# Patient Record
(demographics unavailable — no encounter records)

---

## 2017-02-03 NOTE — CT
CT ABDOMEN AND PELVIS WITHOUT CONTRAST:

2/3/17

 

HISTORY: 

Nausea, vomiting, diarrhea. 

 

FINDINGS:  

The absence of oral and IV contrast reduces the sensitivity of the exam particularly for solid organ
s and bowel. 

 

The lung bases are clear. The patient is status post cholecystectomy. No free air or free fluid is s
een in the abdomen or pelvis. 

 

The liver demonstrates decreased echogenicity compared to the spleen consistent with fatty infiltrat
ion. There is atrophic change in the pancreas. No calculi is seen in the kidneys, ureters, or the ur
inary bladder. No hydroureteronephrosis is seen on either side. There is fluid in loops of small and
 large bowel and the rectum. A normal appearing appendix is seen. There is no evidence of aneurysmal
 dilatation of the abdominal aorta. There are degenerative changes in the spine. A small hiatal matheus
ia is seen. 

 

IMPRESSION:  

1.      Fatty liver. 

2.      Small hiatal hernia. 

3.      No CT evidence of urinary tract calculi/obstruction or appendicitis. 

4.      Probable enteritis/colitis.

 

POS: [facility]

## 2017-02-03 NOTE — RAD
PORTABLE CHEST 1 VIEW:

 

Date:  02/03/17

Time:  1705 hours

 

HISTORY:  

Nausea and vomiting. 

 

FINDINGS:

 

Comparison made with exam of 03/20/13. 

 

The heart size is normal. The lungs are well expanded without focal areas of consolidation, pneumoth
orax, or pleural effusions. 

 

IMPRESSION: 

No radiographic evidence of acute cardiopulmonary process. 

 

 

POS: [facility]

## 2017-02-03 NOTE — ERRECORD
[facility]

                                EMERGENCY RECORD



HPI NAUSEA/VOMITING/DIARRHEA (17:49 ABUS)

CHIEF COMPLAINT: Patient presents for evaluation of

      nausea, Patient presents for evaluation of vomiting,

      Patient presents for evaluation of diarrhea.

      HISTORIAN: History provided by patient, 63 yr old F

      here with acute onset of N/V/D that started about 2 hrs ago. She says

      she doesn't feel good and has had an ulcer before. This does not feel

      the same. Denies any melena. LOCATION FEMALE:

      Symptoms are localized, most severe in the

      epigastrium. QUALITY: Pain is dull in

      nature, described as aching. SEVERITY:

      Currently symptoms are severe, Current severity

      of pain rated as 10/10. TIME COURSE: Sudden onset

      of symptoms, just prior to arrival, There has

      been no change in the patient's symptoms over time.

      ASSOCIATED WITH FEMALE: Associated with diarrhea,

      Associated with nausea, Associated with vomiting.

      EXACERBATED BY: Patient's condition exacerbated by nothing.

      RELIEVED BY: Patient's condition relieved by

      nothing.



ROS (17:51 ABUS)

CONSTITUTIONAL: Negative constitutional review of systems,

      Historian denies chills, denies fever.

EYES: Negative eye review of systems, Historian denies eye pain,

      denies vision changes.

ENT: Negative ears, nose, throat review of systems, Historian

      denies rhinorrhea, denies sore throat, denies voice changes.

CARDIOVASCULAR: Negative cardiovascular review of systems,

      Historian denies chest pain, denies palpitations.

RESPIRATORY: Negative respiratory review of systems, Historian

      denies cough, denies shortness of breath.

GI: Historian reports abdominal pain, reports

      diarrhea, denies hematochezia, denies jaundice, denies melena,

      reports nausea, reports vomiting.

GENITOURINARY FEMALE: Negative genitourinary review of systems,

      Historian denies dysuria, denies frequency.

MUSCULOSKELETAL: Negative musculoskeletal review of systems,

      Historian denies back pain, denies fall, denies injury.

SKIN: Negative skin review of systems, Historian denies rash,

      denies skin changes.

NEUROLOGIC: Negative neurologic review of systems, Historian

      denies headache, denies mental status changes, denies paralysis,

      denies paresthesias, denies sensory changes.

HEMO/LYMPHATIC: Normal hematologic/lymphatic system review,

      Historian denies abnormal blood clotting.

ALLERGIC/IMMUNOLOGIC: Normal allergy/immunologic system review,

      Historian denies frequent infections.



PAST MEDICAL HISTORY



&a-1R&a+25V*p+0X*[id number]*c202B*c15G*c2P*p-0X&a-25V&a+1R         Name: Anne Barbosa  : 1953 F63 MedRec: [id number]

                             AcctNum: [id number]

  Prepared:  20:07 by Interface                 Page 1 of 4

                                      pMD

                        [facility]

                                EMERGENCY RECORD



MEDICAL HISTORY: Notes: multiple myleloma, stem cell

      transplant,, Past medical history includes history of

      diabetes, Type II, Past medical history includes

      endocrine disease, hyperthyroidism, Past medical

      history includes gastrointestinal disease, gastroesophageal

      reflux disease, peptic ulcer disease, Past

      medical history includes history of hyperlipidemia, high

      cholesterol, Past medical history includes history of

      hypertension, which has been treated, Past medical history

      includes history of obesity, pulmonary disease.

      asthma. (18:25 SFRE)

FEMALE SURGICAL HISTORY: Surgical history of

      hysterectomy, Surgical history of orthopedic surgery,

      back, Surgical history of tonsillectomy. (18:29

      SFRE)

PSYCHIATRIC HISTORY: Psychiatric history includes,

      anxiety, depression. (18:30 SFRE)



KNOWN ALLERGIES

tetracycline



CURRENT MEDICATIONS (18:36 SFRE)

metFORMIN: 

      TABLET : Strength - 1,000 mg : ORAL

      Patient Dose: 500 mg Oral 2 times a day.

Synthroid: 

      TABLET : Strength - 50 mcg : ORAL

      Patient Dose: 5 mg Oral once a day.



VITAL SIGNS (16:30 SFRE)

VITAL SIGNS: BP: 155/83, Pulse: 80, Resp: 18, Temp: 98.4

      (Tympanic), Pain: 10 (Stabbing), O2 sat: 100 on 2L Oxygen, Time:

      2/3/2017 16:30.



PHYSICAL EXAM (17:51 ABUS)

CONSTITUTIONAL: Vital signs reviewed, Patient afebrile, Pulse

      normal, Blood pressure normal, Respiratory rate normal, Patient

      appears non toxic, Patient appears pain free, Patient alert and

      oriented to person, place and time.

HEAD: Head exam normal, Head exam included findings of head

      atraumatic, normocephalic.

EYES: Eye exam normal, Eye exam included findings of eyelids

      normal to inspection, Pupils equally round and reactive to light,

      Extraocular muscles intact, no nystagmus.

ENT: ENT exam normal, Ear exam normal, external ear normal,

      tympanic membranes normal, no bleeding, Pharynx exam normal, Uvula

      exam normal, Tonsil exam normal, Mouth exam normal, mucous membranes

      moist, teeth normal.

NECK: Neck exam normal, Neck exam included findings of normal

      range of motion, Trachea midline, no meningeal signs, no cervical



&a-1R&a+25V*p+0X*[id number]*c202B*c15G*c2P*p-0X&a-25V&a+1R         Name: Anne Barbosa  : 1953 F63 MedRec: [id number]

                             AcctNum: [id number]

  Prepared:  20:07 by Interface                 Page 2 of 4

                                      pMD

                        [facility]

                                EMERGENCY RECORD



      adenopathy, no tenderness.

RESPIRATORY CHEST: Respiratory and chest exam normal, Respiratory

      exam included findings of no respiratory distress, Breath sounds

      clear.

CARDIOVASCULAR: Cardiovascular assessment normal, Cardiovascular

      exam included findings of heart rate regular rate and rhythm, Heart

      sounds normal.

ABDOMEN FEMALE: Abdominal exam included findings of abdomen

      nontender, Bowel sounds, hypoactive, no

      distension, no mass, no peritoneal signs, no rigidity, no guarding,

      no rebound.

BACK: Back exam normal, Back exam included findings of normal

      inspection, range of motion normal, no tenderness.

UPPER EXTREMITY: Upper extremity exam normal, Upper extremity

      exam included findings of inspection normal, Range of motion normal,

      Motor strength normal, Sensation intact, Radial pulse normal.

LOWER EXTREMITY: Lower extremity exam normal, Lower extremity

      exam included findings of inspection normal, Range of motion normal,

      Motor strength normal, Sensation intact, Posterior tibial pulse

      normal, Pedal pulse normal.

NEURO: Neuro exam normal, Neuro exam findings include patient

      oriented to person, place and time, Speech normal, Gait normal,

      Cranial nerves intact, no focal motor deficits, no focal sensory

      deficits.

SKIN: Skin exam normal, Skin exam included findings of skin warm,

      dry, and normal in color, no rash.

PSYCHIATRIC: Psychiatric exam normal, Normal affect.



RADIOLOGYINTERPRETATION (19:32 ABUS)

ABDOMEN: Abdomen/pelvis CT scan, without contrast negative, no

      abdominal aortic aneurysm, no appendicitis, no diverticulitis, no

      kidney stones, no injuries, no mass, no obstruction, no free air, no

      hydronephrosis.

: Preliminary review of CT scans by, ED Physician,

      Radiologist.



MEDICATION ADMINISTRATION SUMMARY



      Drug Name: promethazine injection, Dose Ordered: 25 mg, Route: IV

      Piggy Back, Status: Given, Time: 20:00 2/3/2017,

      Drug Name: *sodium chloride 0.9 % intravenous, Dose Ordered: 2 L,

      Route: IV Fluid Infusion, Status: Given, Time: 19:59 2/3/2017,

      Drug Name: morphine (PF) injection, Dose Ordered: 4 mg, Route: IV

      Push, Status: Given, Time: 18:21 2/3/2017,

      Drug Name: pantoprazole intravenous, Dose Ordered: 40 mg, Route: IV

      Push, Status: Given, Time: 18:09 2/3/2017,

      Drug Name: Zofran intravenous, Dose Ordered: 8 mg, Route: IV Push,

      Status: Given, Time: 18:08 2/3/2017,

      Drug Name: *sodium chloride 0.9 % intravenous, Dose Ordered: 1 L,

      Route: IV Fluid Infusion, Status: Given, Time: 18:05 2/3/2017,



&a-1R&a+25V*p+0X*v4921P*c202B*c15G*c2P*p-0X&a-25V&a+1R         Name: Anne Barbosa  : 1953 F63 MedRec: [id number]

                             AcctNum: [id number]

  Prepared:  20:07 by Interface                 Page 3 of 4

                                      pMD

                        [facility]

                                EMERGENCY RECORD



      *Additional information available in notes, Detailed record available

      in Medication Service section.



DOCTOR NOTES (17:52 ABUS)

TEXT:   63 yr old F here with acute onset of N/V/D that

      started about 2 hrs ago. She says she doesn't feel good and has had

      an ulcer before

      Exam: LISBETH pain to palpation

      DDX: viral gastroenteritis, gastritis, dehydration, electrolyte

      disorder, pancreatitis, cholecystitis

      Plan: IV, IVF, labs, UA, antiemetics, CXR.



PROBLEM LIST

  No recorded problems



DIAGNOSIS (19:45 ABUS)

FINAL: PRIMARY: Diarrhea, ADDITIONAL: Nausea with

      vomiting.



PRESCRIPTION

  No recorded prescriptions



DISPOSITION

PATIENT:  Disposition Type: Transfer, Disposition: Transfer to

      [facility], Disposition Transport: Ambulance, Condition: Fair. (19:45

      ABUS)

   Patient left the department. (20:03 JDEA)

Bernal:

  GENIE=MD Christie, Bart NEAL=Billy, MAX, Julisa SIMMONS=MAX Angel, Iraida













































&a-1R&a+25V*p+0X*w7406G*c202B*c15G*c2P*p-0X&a-25V&a+1R         Name: Anne Barbosa  : 1953 F63 MedRec: [id number]

                             AcctNum: [id number]

  Prepared:  20:07 by Interface                 Page 4 of 4

                                      pMD

MTDD

## 2017-02-03 NOTE — PICIS
[facility]

                                EMERGENCY RECORD



TRIAGE ( 16:31 SFRE)

TRIAGE NOTES:  N/V/D THAT STARTED 2 HOURS AGO. ( 16:31 SFRE)

PATIENT: AGE: 63, GENDER: female, : Wed Dec 30, 1953, TIME OF

      GREET:  16:28, PREFERRED LANGUAGE: English, ECODE

      BILLING MAP: [facility], Zip Code: [facility], [place] WEIGHT:

      104.33, PHONE: [phone], MEDICAL RECORD NUMBER: [id number],

      ACCOUNT NUMBER: [id number], PERSON ID: [id number], PCP: OOT. ( 16:31 SFRE)

  NAME: Anne Barbosa, ETHNICITY: Not  or . (16:44)

COMPLAINT:  DIARRHEA, VOMITING. ( 16:31 SFRE)

ADMISSION: URGENCY: 3 Urgent, ADMISSION SOURCE: Home, TRANSPORT:

      Walk-in, BED: ED -03. ( 16:31 SFRE)

SIRS SCORING: Heart Rate  (0), Temp range 96.8-101.1 (0),

      respiratory rate 12-24 (0), Mental Status altered: no (0). (18:25

      SFRE)

TRIAGE SCREENING: Patient denies suicidal ideation, Patient

      denies presence of domestic violence. (18:25 SFRE)

PROVIDERS: TRIAGE NURSE: Iraida Angel RN. (

      16:31 SFRE)

VITAL SIGNS: /83, Pulse 80, Resp 18, Temp 98.4, (Tympanic),

      Pain 10, (Stabbing), O2 Sat 100, on 2L Oxygen, Time 2/3/2017 16:30.

      (16:30 SFRE)



KNOWN ALLERGIES

tetracycline



CURRENT MEDICATIONS (18:36 SFRE)

metFORMIN: 

      TABLET : Strength - 1,000 mg : ORAL

      Patient Dose: 500 mg Oral 2 times a day.

Synthroid: 

      TABLET : Strength - 50 mcg : ORAL

      Patient Dose: 5 mg Oral once a day.



VITAL SIGNS (16:30 SFRE)

VITAL SIGNS: BP: 155/83, Pulse: 80, Resp: 18, Temp: 98.4

      (Tympanic), Pain: 10 (Stabbing), O2 sat: 100 on 2L Oxygen, Time:

      2/3/2017 16:30.



NURSING ASSESSMENT: ABDOMEN (18:30 SFRE)

CONSTITUTIONAL: Patient arrives ambulatory, Gait steady, History

      obtained from patient, Patient appears, generally ill,

      Patient cooperative, Patient alert, Oriented to person, place and

      time, Skin abnormal, Skin temperature is cold, Skin dry,

      Skin, pale in color, Mucous membranes pink,

      Mucous membranes, tacky, Patient is well-groomed,

      Patient complains of N/V/D.

PAIN: stabbing pain, diffusely, Onset of pain

      2 HRS PTA, constant, on a scale 0-10 patient



&a-1R&a+25V*p+0X*c6655I*c202B*c15G*c2P*p-0X&a-25V&a+1R         Name: Anne Barbosa  : 1953 F63 MedRec: [id number]

                             AcctNum: [id number]

  Prepared:  20:07 by Interface                 Page 1 of 13

                                      pMD

                        [facility]

                                EMERGENCY RECORD



      rates pain as 10, Pain exacerbated by nothing, Nothing has

      been tried to alleviate the pain.

ABDOMEN: Abdomen assessment findings include abdomen symmetrical,

      Abdomen soft, tender, diffusely, Associated

      with nausea, Associated with vomiting, Associated

      with diarrhea, Number of episodes: MULTIPLE,

      CLEAR FLUID.

GENITOURINARY FEMALE: no associated urinary complaints.

SAFETY: Side rails up, Cart/Stretcher in lowest position, Family

      at bedside, Call light within reach, Hospital ID band on.



NURSING ASSESSMENT: FALL RISK (19:42 JDEA)

FALL RISK: Fall risk assessment findings include: no history of

      falls (0), No bed rest greater than 2 days (0), No use of level of

      consciousness altering agents with mentation or cognitive changes

      (0), No change in blood pressure (0), No sensory deficits (0), No

      impaired mobility (0), No neurologic diagnosis (0), Elimination

      problems (3), No confusion (0), Total score 3, No risk

      for fall, Notes: utilizes standby assist.



NURSING ASSESSMENT: SKIN (19:42 JDEA)

SKIN: Skin assessment findings include skin warm, Skin dry, Skin

      normal in color.

MARY ANN SCALE: (4) Sensory perception has no impairment, (4) Skin

      is rarely moist, (3) Patient walks occasionally, (3)

      Slightly limited mobility, (3) Adequate nutrition,

      (2) Patient has potential problem moving, Mary Ann Risk

      Total: 19.

NOTES: Patient tolerated procedure well.



NURSING PROCEDURE: ELIMINATION (19:20 JDEA)

PATIENT IDENTIFIER: Patient actively involved in identification

      process.

ELIMINATION: Patient assisted to bedside commode, Patient had a

      large amount of stool per bedpan, liquid in form, Stool

      specimen collected, labeled in the presence of the patient and sent

      to lab, Patient incontinent of stool, Incontinence

      care given, Stool specimen collected, and sent to lab, Stool

      specimen for culture collected, and sent to lab, and labeled in the

      presence of the patient.

NOTES: Patient tolerated procedure well.



NURSING PROCEDURE: IV

IV SITE 1: IV therapy indicated for hydration, IV therapy

      indicated for medication administration, IV established, to the right

      antecubital, using a 20 gauge catheter, in one attempt, Saline lock

      established, Flushed with normal saline (mls): 5CC. (18:33 SFRE)

FOLLOW-UP SITE 1: After procedure, no drainage at IV site, After

      procedure, no swelling at IV site, After procedure, no redness at IV

      site. (18:34 SFRE)



&a-1R&a+25V*p+0X*j3654W*c202B*c15G*c2P*p-0X&a-25V&a+1R         Name: Anne Barbosa  : 1953 F63 MedRec: [id number]

                             AcctNum: [id number]

  Prepared:  20:07 by Interface                 Page 2 of 13

                                      pMD

                        [facility]

                                EMERGENCY RECORD





NURSING PROCEDURE: NURSE NOTES (18:37 SFRE)

NURSES NOTES: Notes: ON BSC WITH CONTINUOUS DIARRHEA.



NURSING PROCEDURE: TRANSFER (20:01 JDEA)

TRANSFER: Reason for transfer need for specialized care,

      Diagnosis: colitis, Accepting institution: [facility],

      Accepting physician: luci, Referring physician: christie, Transported

      by non-urgent ambulance, accompanied by emergency medical services

      personnel, Report called to receiving facility, Summary of Care

      printed.

BELONGINGS: Belongings and valuables with patient at time of

      discharge include:, Belongings remain with patient.

NOTES: Patient tolerated procedure well.



NURSING PROCEDURE: TRANSPORT TO TESTS

PATIENT IDENTIFIER: Patient actively involved in identification

      process, Patient's identity verified by patient stating name,

      Patient's identity verified by patient stating birth date, Patient's

      identity verified by hospital ID bracelet. (18:59 JDEA)

TRANSPORT TO TESTS: Transport indicated to facilitate diagnosis,

      Patient transported to CT scan, via wheelchair, Accompanied by x-ray

      technician. (18:59 JDEA)

FOLLOW-UP: After procedure, patient returned to emergency

      department. (19:15 JDEA)

NOTES: Patient tolerated procedure well. (18:59 JDEA)

SAFETY: Side rails up, Cart/Stretcher in lowest position, Family

      at bedside, Call light within reach, Hospital ID band on. (18:59

      JDEA)



ORDER DETAILS



      Order Name: Cardiac Profile w/CKMB & Troponin - I, Status: Active,

      Time: 16:43 2/3/2017, User: ABUS,

       - Ordered for: MD Soriano Anthony,

       - Entered by: MD Soriano Anthony -  16:43,

       - Quantity: 1,

      Order Name: CBC with Differential, Status: Active, Time: 16:43

      2/3/2017, User: ABUS,

       - Ordered for: MD Soriano Anthony,

       - Entered by: MD Soriano Anthony -  16:43,

       - Quantity: 1,

      Order Name: Clostridium difficile Screen, Status: Active, Time: 18:58

      2/3/2017, User: JDEA,

       - Ordered for: MD Soriano Anthony,

       - Entered by: MAX Cervantes Justine -  18:58,

       - Quantity: 1,

      Order Name: Comprehensive Metabolic Panel, Status: Active, Time:

      16:43 2/3/2017, User: ABUS,

       - Ordered for: MD Soriano Anthony,



&a-1R&a+25V*p+0X*b3666F*c202B*c15G*c2P*p-0X&a-25V&a+1R         Name: Anne Barbosa  : 1953 F63 MedRec: [id number]

                             AcctNum: [id number]

  Prepared:  20:07 by Interface                 Page 3 of 13

                                      pMD

                        [facility]

                                EMERGENCY RECORD



       - Entered by: MD Soriano Anthony -  16:43,

       - Quantity: 1,

      Order Name: CT Abdomen Pelvis W Con, Status: Canceled, Time: 18:41

      2/3/2017, User: System,

       - Ordered for: MD Soriano Anthony,

       - Entered by: MD Soriano Anthony -  18:09,

       - Quantity: 1,

      Order Name: Culture, Blood, Status: Active, Time: 18:08 2/3/2017,

      User: ABUS,

       - Ordered for: MD Soriano Anthony,

       - Entered by: MD Soriano Anthony -  18:08,

       - Quantity: 1,

      Order Name: Culture, Stool & Campy Screen, Status: Active, Time:

      18:58 2/3/2017, User: VAL,

       - Ordered for: MD Soriano Anthony,

       - Entered by: MAX Cervantes, Julisa -  18:58,

       - Quantity: 1,

      Order Name: Culture, Stool for E.coli O157, Status: Active, Time:

      18:58 2/3/2017, User: VAL,

       - Ordered for: MD Soriano Anthony,

       - Entered by: MAX Cervantes, Julisa -  18:58,

       - Quantity: 1,

      Order Name: Culture, Urine, Status: Active, Time: 18:08 2/3/2017,

      User: AB,

       - Ordered for: MD Soriano Anthony,

       - Entered by: MD Soriano Anthony -  18:08,

       - Quantity: 1,

      Order Name: Fecal Lactoferrin, Status: Active, Time: 18:58 2/3/2017,

      User: VAL,

       - Ordered for: MD Soriano Anthony,

       - Entered by: MAX Cervantes, Julisa -  18:58,

       - Quantity: 1,

      Order Name: Lactic Acid, Status: Active, Time: 16:44 2/3/2017, User:

      GENIE,

       - Ordered for: MD Soriano Anthony,

       - Entered by: MD Soriano Anthony -  16:44,

       - Quantity: 1,

      Order Name: Lipase, Status: Active, Time: 16:43 2/3/2017, User: ABUS,

       - Ordered for: MD Soriano Anthony,

       - Entered by: MD Soriano Anthony -  16:43,

       - Quantity: 1,

      Order Name: Magnesium, Status: Active, Time: 16:44 2/3/2017, User:

      ABUS,

       - Ordered for: MD Soriano Anthony,

       - Entered by: MD Soriano Anthony -  16:44,

       - Quantity: 1,

      Order Name: SALINE LOCK, Status: Done, Time: 18:26 2/3/2017, User:

      SFRE,

       - Ordered for: MD Soriano Anthony,

       - Entered by: MD Soriano Anthony -  16:44,



&a-1R&a+25V*p+0X*h8546L*c202B*c15G*c2P*p-0X&a-25V&a+1R         Name: Anne Barbosa  : 1953 F63 MedRec: [id number]

                             AcctNum: [id number]

  Prepared:  20:07 by Interface                 Page 4 of 13

                                      pMD

                        [facility]

                                EMERGENCY RECORD



       - Quantity: 1,

      Order Name: Type & Screen, Status: Active, Time: 16:43 2/3/2017,

      User: ABUS,

       - Ordered for: MD Soriano Anthony,

       - Entered by: MD Soriano Anthony -  16:43,

       - Quantity: 1,

      Order Name: Urinalysis w/ Rflx Microscopic, Status: Active, Time:

      16:43 2/3/2017, User: ABUS,

       - Ordered for: MD Soriano Anthony,

       - Entered by: MD Soriano Anthony -  16:43,

       - Quantity: 1,

      Order Name: XR Chest 1 View Portable, Status: Active, Time: 16:43

      2/3/2017, User: ABUS,

       - Ordered for: MD Soriano Anthony,

       - Entered by: MD Soriano Anthony -  16:43,

       - Quantity: 1.



MEDICATION ADMINISTRATION SUMMARY



      Drug Name: promethazine injection, Dose Ordered: 25 mg, Route: IV

      Piggy Back, Status: Given, Time: 20:00 2/3/2017,

      Drug Name: *sodium chloride 0.9 % intravenous, Dose Ordered: 2 L,

      Route: IV Fluid Infusion, Status: Given, Time: 19:59 2/3/2017,

      Drug Name: morphine (PF) injection, Dose Ordered: 4 mg, Route: IV

      Push, Status: Given, Time: 18:21 2/3/2017,

      Drug Name: pantoprazole intravenous, Dose Ordered: 40 mg, Route: IV

      Push, Status: Given, Time: 18:09 2/3/2017,

      Drug Name: Zofran intravenous, Dose Ordered: 8 mg, Route: IV Push,

      Status: Given, Time: 18:08 2/3/2017,

      Drug Name: *sodium chloride 0.9 % intravenous, Dose Ordered: 1 L,

      Route: IV Fluid Infusion, Status: Given, Time: 18:05 2/3/2017,

      *Additional information available in notes, Detailed record available

      in Medication Service section.



MEDICATION SERVICE

morphine (PF) injection:  Order: morphine (PF) injection

      (morphine sulfate/preservative free) - Dose: 4 mg : IV Push

      Schedule: Now

      Ordered by: Bart Soriano MD

      Entered by: Bart Soriano MD  17:57 ,

      Acknowledged by: Iraida Angel RN  18:09

      Documented as given by: Iraida Angel RN  18:21

      Patient, Medication, Dose, Route and Time verified prior to

      administration.

       Amount given: 4mg, IV SITE #1 IVP, subsequent different medication,

      Slowly, Awake and alert- acceptable, Catheter placement confirmed via

      flush prior to administration, IV site without signs or symptoms of

      infiltration during medication administration, No swelling during

      administration, No drainage during administration, IV flushed after

      administration, Correct patient, time, route, dose and medication



&a-1R&a+25V*p+0X*y4475Y*c202B*c15G*c2P*p-0X&a-25V&a+1R         Name: Anne Barbosa  : 1953 F63 MedRec: [id number]

                             AcctNum: [id number]

  Prepared:  20:07 by Interface                 Page 5 of 13

                                      pMD

                        [facility]

                                EMERGENCY RECORD



      confirmed prior to administration, Patient advised of actions and

      side-effects prior to administration, Allergies confirmed and

      medications reviewed prior to administration, Patient in position of

      comfort, Side rails up, Cart in lowest position, Family at bedside.

pantoprazole intravenous:  Order: pantoprazole intravenous

      (pantoprazole sodium) - Dose: 40 mg : IV Push

      Schedule: Now

      Ordered by: Bart Soriano MD

      Entered by: Bart Soriano MD  16:45 ,

      Acknowledged by: Iraida Angel RN  17:56

      Documented as given by: Iraida Angel RN  18:09

      Patient, Medication, Dose, Route and Time verified prior to

      administration.

       Amount given: 40mg, IV SITE #1 IVP, subsequent different medication,

      Slowly, Awake and alert- acceptable, Catheter placement confirmed via

      flush prior to administration, IV site without signs or symptoms of

      infiltration during medication administration, No swelling during

      administration, No drainage during administration, IV flushed after

      administration, Correct patient, time, route, dose and medication

      confirmed prior to administration, Patient advised of actions and

      side-effects prior to administration, Allergies confirmed and

      medications reviewed prior to administration, Patient in position of

      comfort, Side rails up, Cart in lowest position, Family at bedside.

promethazine injection:  Order: promethazine injection

      (promethazine HCl) - Dose: 25 mg : IV Piggy Back

      Schedule: Now

      Ordered by: Bart Soriano MD

      Entered by: Bart Soriano MD  19:13 ,

      Acknowledged by: Julisa Cervantes RN  19:24

      Documented as given by: Julisa Cervantes RN  20:00

      Patient, Medication, Dose, Route and Time verified prior to

      administration.

       Amount given: 25mg, IV SITE #1 IVPB or drip, initial infusion, Awake

      and alert- acceptable, Ordering Physician approved to Give,

      Connections checked prior to administration, Line traced prior to

      administration, Catheter placement confirmed via flush prior to

      administration, IV site without signs or symptoms of infiltration

      during medication administration, No swelling during administration,

      No drainage during administration, IV flushed after administration,

      Correct patient, time, route, dose and medication confirmed prior to

      administration, Patient advised of actions and side-effects prior to

      administration, Allergies confirmed and medications reviewed prior to

      administration, Patient in position of comfort, Side rails up, Cart

      in lowest position, Family at bedside, Call light in reach.

sodium chloride 0.9 % intravenous:  Order: sodium chloride 0.9 %

      intravenous (0.9 % sodium chloride) - Dose: 1 L : IV Fluid

      Infusion

      Schedule: Now

      Notes: (Bolus)

      Ordered by: Bart Soriano MD



&a-1R&a+25V*p+0X*y2245B*c202B*c15G*c2P*p-0X&a-25V&a+1R         Name: Anne Barbosa  : 1953 F63 MedRec: [id number]

                             AcctNum: [id number]

  Prepared:  20:07 by Interface                 Page 6 of 13

                                      pMD

                        [facility]

                                EMERGENCY RECORD



      Entered by: Bart Soriano MD  16:44 ,

      Acknowledged by: Iraida Angel RN  17:56

      Documented as given by: Iraida Angel RN  18:05

      Patient, Medication, Dose, Route and Time verified prior to

      administration.

       Amount given: 1l, IV SITE #1 IV fluids established for hydration, IV

      SITE #1 into left antecubital, IV SITE #1 Rate of bolus, wide open,

      via primary tubing, IV SITE #1 Tubing changed to pump tubing, Awake

      and alert- acceptable, Catheter placement confirmed via flush prior

      to administration, IV site without signs or symptoms of infiltration

      during medication administration, No swelling during administration,

      No drainage during administration, IV flushed after administration,

      Correct patient, time, route, dose and medication confirmed prior to

      administration, Patient advised of actions and side-effects prior to

      administration, Allergies confirmed and medications reviewed prior to

      administration, Patient in position of comfort, Side rails up, Cart

      in lowest position, Family at bedside.

: Follow Up : Response assessment performed, No signs or

      symptoms of allergic reaction noted, _IV SITE #1:_, IV fluid infusion

      discontinued, on  20:00, Total fluid hydration time

      IV site 1 1 hour, 55 minutes, ., Total amount infused: 1L.

      (20:00 JDEA)

sodium chloride 0.9 % intravenous:  Order: sodium chloride 0.9 %

      intravenous (0.9 % sodium chloride) - Dose: 2 L : IV Fluid

      Infusion

      Schedule: Now

      Notes: (Bolus)

      Ordered by: Bart Soriano MD

      Entered by: Bart Soriano MD  18:01 ,

      Acknowledged by: Iraida Angel RN  18:39

      Documented as given by: Julisa Cervantes RN  19:59

      Patient, Medication, Dose, Route and Time verified prior to

      administration.

       Amount given: 2L, IV SITE #1 IV fluids established for hydration,

      Catheter placement confirmed via flush prior to administration, IV

      site without signs or symptoms of infiltration during medication

      administration, No swelling during administration, No drainage during

      administration, IV flushed after administration, Correct patient,

      time, route, dose and medication confirmed prior to administration,

      Patient advised of actions and side-effects prior to administration,

      Allergies confirmed and medications reviewed prior to administration,

      Patient in position of comfort, Side rails up, Cart in lowest

      position, Family at bedside, Call light in reach.

: Follow Up : Response assessment performed, No signs or

      symptoms of allergic reaction noted, _IV SITE #1:_, IV fluid infusion

      discontinued, on  20:01, 5 minutes, ., Total amount

      infused: 2L. (20:01 JDEA)

Zofran intravenous:  Order: Zofran intravenous (ondansetron HCl)

      - Dose: 8 mg : IV Push

      Schedule: Now



&a-1R&a+25V*p+0X*c8251D*c202B*c15G*c2P*p-0X&a-25V&a+1R         Name: Anne Barbosa  : 1953 F63 MedRec: [id number]

                             AcctNum: [id number]

  Prepared:  20:07 by Interface                 Page 7 of 13

                                      pMD

                        [facility]

                                EMERGENCY RECORD



      Ordered by: Bart Soriano MD

      Entered by: Bart Soriano MD Fri 2017 16:46 ,

      Acknowledged by: Iraida Angel RN Fri 2017 17:56

      Documented as given by: Iraida Angel RN Fri 2017 18:08

      Patient, Medication, Dose, Route and Time verified prior to

      administration.

       Amount given: 8mg, IV SITE #1 IVP, initial medication, Slowly, Awake

      and alert- acceptable, Catheter placement confirmed via flush prior

      to administration, IV site without signs or symptoms of infiltration

      during medication administration, No swelling during administration,

      No drainage during administration, IV flushed after administration,

      Correct patient, time, route, dose and medication confirmed prior to

      administration, Patient advised of actions and side-effects prior to

      administration, Allergies confirmed and medications reviewed prior to

      administration, Patient in position of comfort, Side rails up, Cart

      in lowest position, Family at bedside.



HPI NAUSEA/VOMITING/DIARRHEA (17:49 ABUS)

CHIEF COMPLAINT: Patient presents for evaluation of

      nausea, Patient presents for evaluation of vomiting,

      Patient presents for evaluation of diarrhea.

      HISTORIAN: History provided by patient, 63 yr old F

      here with acute onset of N/V/D that started about 2 hrs ago. She says

      she doesn't feel good and has had an ulcer before. This does not feel

      the same. Denies any melena. LOCATION FEMALE:

      Symptoms are localized, most severe in the

      epigastrium. QUALITY: Pain is dull in

      nature, described as aching. SEVERITY:

      Currently symptoms are severe, Current severity

      of pain rated as 10/10. TIME COURSE: Sudden onset

      of symptoms, just prior to arrival, There has

      been no change in the patient's symptoms over time.

      ASSOCIATED WITH FEMALE: Associated with diarrhea,

      Associated with nausea, Associated with vomiting.

      EXACERBATED BY: Patient's condition exacerbated by nothing.

      RELIEVED BY: Patient's condition relieved by

      nothing.



ROS (17:51 ABUS)

CONSTITUTIONAL: Negative constitutional review of systems,

      Historian denies chills, denies fever.

EYES: Negative eye review of systems, Historian denies eye pain,

      denies vision changes.

ENT: Negative ears, nose, throat review of systems, Historian

      denies rhinorrhea, denies sore throat, denies voice changes.

CARDIOVASCULAR: Negative cardiovascular review of systems,

      Historian denies chest pain, denies palpitations.

RESPIRATORY: Negative respiratory review of systems, Historian

      denies cough, denies shortness of breath.

GI: Historian reports abdominal pain, reports



&a-1R&a+25V*p+0X*v4616D*c202B*c15G*c2P*p-0X&a-25V&a+1R         Name: Anne Barbosa  : 1953 F63 MedRec: [id number]

                             AcctNum: [id number]

  Prepared:  20:07 by Interface                 Page 8 of 13

                                      pMD

                        [facility]

                                EMERGENCY RECORD



      diarrhea, denies hematochezia, denies jaundice, denies melena,

      reports nausea, reports vomiting.

GENITOURINARY FEMALE: Negative genitourinary review of systems,

      Historian denies dysuria, denies frequency.

MUSCULOSKELETAL: Negative musculoskeletal review of systems,

      Historian denies back pain, denies fall, denies injury.

SKIN: Negative skin review of systems, Historian denies rash,

      denies skin changes.

NEUROLOGIC: Negative neurologic review of systems, Historian

      denies headache, denies mental status changes, denies paralysis,

      denies paresthesias, denies sensory changes.

HEMO/LYMPHATIC: Normal hematologic/lymphatic system review,

      Historian denies abnormal blood clotting.

ALLERGIC/IMMUNOLOGIC: Normal allergy/immunologic system review,

      Historian denies frequent infections.



PAST MEDICAL HISTORY

MEDICAL HISTORY: Notes: multiple myleloma, stem cell

      transplant,, Past medical history includes history of

      diabetes, Type II, Past medical history includes

      endocrine disease, hyperthyroidism, Past medical

      history includes gastrointestinal disease, gastroesophageal

      reflux disease, peptic ulcer disease, Past

      medical history includes history of hyperlipidemia, high

      cholesterol, Past medical history includes history of

      hypertension, which has been treated, Past medical history

      includes history of obesity, pulmonary disease.

      asthma. (18:25 SFRE)

FEMALE SURGICAL HISTORY: Surgical history of

      hysterectomy, Surgical history of orthopedic surgery,

      back, Surgical history of tonsillectomy. (18:29

      SFRE)

PSYCHIATRIC HISTORY: Psychiatric history includes,

      anxiety, depression. (18:30 SFRE)



PHYSICAL EXAM (17:51 ABUS)

CONSTITUTIONAL: Vital signs reviewed, Patient afebrile, Pulse

      normal, Blood pressure normal, Respiratory rate normal, Patient

      appears non toxic, Patient appears pain free, Patient alert and

      oriented to person, place and time.

HEAD: Head exam normal, Head exam included findings of head

      atraumatic, normocephalic.

EYES: Eye exam normal, Eye exam included findings of eyelids

      normal to inspection, Pupils equally round and reactive to light,

      Extraocular muscles intact, no nystagmus.

ENT: ENT exam normal, Ear exam normal, external ear normal,

      tympanic membranes normal, no bleeding, Pharynx exam normal, Uvula

      exam normal, Tonsil exam normal, Mouth exam normal, mucous membranes

      moist, teeth normal.

NECK: Neck exam normal, Neck exam included findings of normal



&a-1R&a+25V*p+0X*t6285N*c202B*c15G*c2P*p-0X&a-25V&a+1R         Name: Anne Barbosa  : 1953 F63 MedRec: [id number]

                             AcctNum: [id number]

  Prepared:  20:07 by Interface                 Page 9 of 13

                                      pMD

                        [facility]

                                EMERGENCY RECORD



      range of motion, Trachea midline, no meningeal signs, no cervical

      adenopathy, no tenderness.

RESPIRATORY CHEST: Respiratory and chest exam normal, Respiratory

      exam included findings of no respiratory distress, Breath sounds

      clear.

CARDIOVASCULAR: Cardiovascular assessment normal, Cardiovascular

      exam included findings of heart rate regular rate and rhythm, Heart

      sounds normal.

ABDOMEN FEMALE: Abdominal exam included findings of abdomen

      nontender, Bowel sounds, hypoactive, no

      distension, no mass, no peritoneal signs, no rigidity, no guarding,

      no rebound.

BACK: Back exam normal, Back exam included findings of normal

      inspection, range of motion normal, no tenderness.

UPPER EXTREMITY: Upper extremity exam normal, Upper extremity

      exam included findings of inspection normal, Range of motion normal,

      Motor strength normal, Sensation intact, Radial pulse normal.

LOWER EXTREMITY: Lower extremity exam normal, Lower extremity

      exam included findings of inspection normal, Range of motion normal,

      Motor strength normal, Sensation intact, Posterior tibial pulse

      normal, Pedal pulse normal.

NEURO: Neuro exam normal, Neuro exam findings include patient

      oriented to person, place and time, Speech normal, Gait normal,

      Cranial nerves intact, no focal motor deficits, no focal sensory

      deficits.

SKIN: Skin exam normal, Skin exam included findings of skin warm,

      dry, and normal in color, no rash.

PSYCHIATRIC: Psychiatric exam normal, Normal affect.



EVENTS

TRANSFER:  Triage to Emergency Main ED -03. (

      16:31 SFRE)

   Removed from Emergency Main ED -03. (20:03 JDEA)



RADIOLOGYINTERPRETATION (19:32 ABUS)

ABDOMEN: Abdomen/pelvis CT scan, without contrast negative, no

      abdominal aortic aneurysm, no appendicitis, no diverticulitis, no

      kidney stones, no injuries, no mass, no obstruction, no free air, no

      hydronephrosis.

: Preliminary review of CT scans by, ED Physician,

      Radiologist.



DOCTOR NOTES (17:52 ABUS)

TEXT:   63 yr old F here with acute onset of N/V/D that

      started about 2 hrs ago. She says she doesn't feel good and has had

      an ulcer before

      Exam: LISBETH pain to palpation

      DDX: viral gastroenteritis, gastritis, dehydration, electrolyte

      disorder, pancreatitis, cholecystitis

      Plan: IV, IVF, labs, UA, antiemetics, CXR.



&a-1R&a+25V*p+0X*q8234S*c202B*c15G*c2P*p-0X&a-25V&a+1R         Name: Anne Barbosa  : 1953 F63 MedRec: [id number]

                             AcctNum: [id number]

  Prepared:  20:07 by Interface                 Page 10 of 13

                                      pMD

                        [facility]

                                EMERGENCY RECORD





PROBLEM LIST

  No recorded problems



DIAGNOSIS (19:45 ABUS)

FINAL: PRIMARY: Diarrhea, ADDITIONAL: Nausea with

      vomiting.



DISPOSITION

PATIENT:  Disposition Type: Transfer, Disposition: Transfer to

      [facility], Disposition Transport: Ambulance, Condition: Fair. (19:45

      ABUS)

   Patient left the department. (20:03 JDEA)



PRESCRIPTION

  No recorded prescriptions



IMAGING

*MEMORANDUM OF TRANSFER:  Image captured from scanner. (19:53

      JDEA)

CONSENT FOR XFER:  Image captured from scanner. (19:53 JDEA)

EMS TRANSPORT ORDERS:  Image captured from scanner. (19:53 JDEA)

*SUPPLY CHARGE SHEET:  Image captured from scanner. (20:04 JDEA)



ADMIN (19:46 ABUS)

DIGITAL SIGNATURE:  MD Soriano Anthony.



RESULTS

RADIOLOGY: XR Chest 1 View Portable Observe DT: 

      16:46,

      CXRP

      PORTABLE CHEST 1 VIEW:



      Date: 17

      Time: 1705 hours



      HISTORY:

      Nausea and vomiting.



      FINDINGS:



      Comparison made with exam of 13.



      The heart size is normal. The lungs are well expanded without focal

      areas of consolidation, pneumoth

      orax, or pleural effusions.



      IMPRESSION:

      No radiographic evidence of acute cardiopulmonary process.





&a-1R&a+25V*p+0X*d8674D*c202B*c15G*c2P*p-0X&a-25V&a+1R         Name: Anne Barbosa  : 1953 F63 MedRec: [id number]

                             AcctNum: [id number]

  Prepared:  20:07 by Interface                 Page 11 of 13

                                      pMD

                        [facility]

                                EMERGENCY RECORD



      POS: H

       . (17:48 ABUS)

LABORATORY: Lactic Acid Collection DT:  17:43,

      See comment below , CALLED RESULTS TO MAX MATA @ ER ROOM#: ED-03 ,

      Critical Call Chem-Lactate CALLED W/READ BACK , 

      *Lactic Acid 6.2 - *H mmol/L, Range (0.5-2.2). (18:02 ABUS)

  Magnesium Collection DT:  17:43,

      Magnesium 1.9 mg/dL, Range (1.6-2.6), NOTE: Higher values can be

      expected in females during menses . (18:02 ABUS)

  Lipase Collection DT:  17:43,

      Lipase 14 U/L, Range (8-78). (18:02 ABUS)

  Comprehensive Metabolic Panel Collection DT:  17:43,

      Sodium 140 mmol/L, Range (136-145),

      Potassium 4.3 mmol/L, Range (3.5-5.1),

      Chloride 99 mmol/L, Range (), 

      *Carbon Dioxide 19 - L mmol/L, Range (23-31), 

      *Anion Gap 26 - H mmol/L, Range (10-20),

      BUN (Urea Nitrogen) 13 mg/dL, Range (9.8-20.1),

      Creatinine 0.98 mg/dL, Range (0.6-1.1),

      Estimated GFR-MDRD 57 , Reference Range for Estimated GFR:

       Greater than 90, mL/min/1.73 m2



      NOTE:

      The MDRD equation has not been validated for use, with the

      elderly (over 70 years of age), pregnant women, patients

      with, serious comorbid condition or persons with extremes of

      body size, muscle, mass, or nutritional status. , 

      *Glucose 178 - H mg/dL, Range (),

      Calcium 9.9 mg/dL, Range (7.8-10.44),

      Bilirubin, Total 0.6 mg/dL, Range (0.2-1.2),

      Protein, Total 8.1 g/dL, Range (5.8-8.1), NOTE: Plasma values are

      generally 0.3 to 0.5 g/dL higher

      than serum values, due to the presence of fibrinogen. ,

      Albumin 4.6 g/dL, Range (3.4-4.8),

      Globulin 3.5 g/dL, Range (2.4-3.5),

      Alb/Glob Ratio 1.3 g/dL, Range (1.2-2.2),

      Alkaline Phosphatase 88 U/L, Range (), 

      *AST (SGOT) 42 - H U/L, Range (5-34),

      ALT (SGPT) 36 U/L, Range (0-55). (18:02 ABUS)

  CBC with Differential Collection DT:  17:43, 

      *White Blood Cell (WBC) Count 3.2 - L thou/uL, Range (4.8-10.8),

      

      *Red Blood Cell (RBC) Count 5.42 - H mill/uL, Range (4.20-5.40),

      Hemoglobin 16.0 g/dL, Range (12.0-16.0), 

      *Hematocrit 49.7 - H %, Range (36.0-47.0),

      Mean Corpuscular Volume 91.7 fl, Range (81.0-99.0),

      Mean Corpuscular Hemoglobin 29.4 pg, Range (27.0-31.0),

      Mean Corpuscular HGB CONC 32.1 g/dL, Range (32.0-36.0), 

      *RBC Distribution Width 14.8 - H %, Range (11.5-14.5),

      Platelet Count 174 thou/uL, Range (130-400), 



&a-1R&a+25V*p+0X*n5071U*c202B*c15G*c2P*p-0X&a-25V&a+1R         Name: Anne Barbosa  : 1953 F63 MedRec: [id number]

                             AcctNum: [id number]

  Prepared:  20:07 by Interface                 Page 12 of 13

                                      pMD

                        [facility]

                                EMERGENCY RECORD



      *Mean Platelet Volume 7.2 - L fL, Range (7.4-10.4),

      %Neutrophils 71.7 %, Range (42.0-75.0), 

      *%Lymphocytes 15.4 - L %, Range (21.0-51.0),

      %Monocytes 8.9 %, Range (0.0-10.0),

      %Eosinophils 2.6 %, Range (0.0-10.0), 

      *%Basophils 1.4 - H %, Range (0.0-1.0),

      #Neutrophils 2.3 thou/uL, Range (1.40-6.50), 

      *#Lymphocytes 0.5 - L thou/uL, Range (1.20-3.40),

      #Monocytes 0.3 thou/uL, Range (0.11-0.59),

      #Eosinphils 0.1 thou/uL, Range (0.0-0.7),

      #Basophils 0.0 thou/uL, Range (0.0-0.2). (18:02 ABUS)

  Cardiac Profile w/CKMB & TropI Collection DT:  17:43,

      CKMB 5.2 ng/mL, Range (0-6.6),

      Troponin I Less than 0.010 ng/mL, Range (< 0.028),

      ********************* Reference Range **********************



       , 0.00 - 0.028 ng/mL Negative

       0.029 - 0.29 ng/mL , Indeterminate

       Greater or Equal to 0.3 ng/mL Strongly suggests MI

       , . (18:06 ABUS)

Bernal:

  GENIE=MD Christie, Bart NEAL=Billy, RN, Julisa SIMMONS=MAX Angel, Iraida



























































&a-1R&a+25V*p+0X*i5820L*c202B*c15G*c2P*p-0X&a-25V&a+1R         Name: Anne Barbosa  : 1953 F63 MedRec: [id number]

                             AcctNum: [id number]

  Prepared:  20:07 by Interface                 Page 13 of 13

                                      pMD

MTDD